# Patient Record
Sex: MALE | Race: WHITE | NOT HISPANIC OR LATINO | Employment: FULL TIME | URBAN - METROPOLITAN AREA
[De-identification: names, ages, dates, MRNs, and addresses within clinical notes are randomized per-mention and may not be internally consistent; named-entity substitution may affect disease eponyms.]

---

## 2023-07-20 ENCOUNTER — LAB (OUTPATIENT)
Dept: LAB | Facility: HOSPITAL | Age: 48
End: 2023-07-20
Payer: COMMERCIAL

## 2023-07-20 DIAGNOSIS — E89.0 POSTABLATIVE HYPOTHYROIDISM: ICD-10-CM

## 2023-07-20 PROBLEM — Z86.39 H/O GRAVES' DISEASE: Status: ACTIVE | Noted: 2023-07-20

## 2023-07-20 LAB
T-UPTAKE NFR SERPL: 0.96 TBI (ref 0.8–1.3)
T4 SERPL-MCNC: 8.36 MCG/DL (ref 4.5–11.7)
TSH SERPL DL<=0.05 MIU/L-ACNC: 2.67 UIU/ML (ref 0.27–4.2)

## 2023-07-20 PROCEDURE — 36415 COLL VENOUS BLD VENIPUNCTURE: CPT

## 2023-07-20 PROCEDURE — 84443 ASSAY THYROID STIM HORMONE: CPT

## 2023-07-20 PROCEDURE — 84436 ASSAY OF TOTAL THYROXINE: CPT

## 2023-07-20 PROCEDURE — 84479 ASSAY OF THYROID (T3 OR T4): CPT

## 2024-10-10 ENCOUNTER — OFFICE VISIT (OUTPATIENT)
Dept: FAMILY MEDICINE CLINIC | Age: 49
End: 2024-10-10
Payer: COMMERCIAL

## 2024-10-10 VITALS
OXYGEN SATURATION: 97 % | BODY MASS INDEX: 23.19 KG/M2 | WEIGHT: 153 LBS | HEART RATE: 68 BPM | DIASTOLIC BLOOD PRESSURE: 71 MMHG | TEMPERATURE: 98.3 F | HEIGHT: 68 IN | SYSTOLIC BLOOD PRESSURE: 119 MMHG

## 2024-10-10 DIAGNOSIS — Z11.59 SCREENING FOR VIRAL DISEASE: ICD-10-CM

## 2024-10-10 DIAGNOSIS — M79.604 PAIN IN BOTH LOWER EXTREMITIES: ICD-10-CM

## 2024-10-10 DIAGNOSIS — Z23 NEED FOR INFLUENZA VACCINATION: Primary | ICD-10-CM

## 2024-10-10 DIAGNOSIS — Z23 NEED FOR TDAP VACCINATION: ICD-10-CM

## 2024-10-10 DIAGNOSIS — R35.0 URINARY FREQUENCY: ICD-10-CM

## 2024-10-10 DIAGNOSIS — Z00.00 WELL ADULT EXAM: ICD-10-CM

## 2024-10-10 DIAGNOSIS — M79.605 PAIN IN BOTH LOWER EXTREMITIES: ICD-10-CM

## 2024-10-10 PROCEDURE — 90656 IIV3 VACC NO PRSV 0.5 ML IM: CPT | Performed by: NURSE PRACTITIONER

## 2024-10-10 PROCEDURE — 99396 PREV VISIT EST AGE 40-64: CPT | Performed by: NURSE PRACTITIONER

## 2024-10-10 PROCEDURE — 90471 IMMUNIZATION ADMIN: CPT | Performed by: NURSE PRACTITIONER

## 2024-10-10 PROCEDURE — 90715 TDAP VACCINE 7 YRS/> IM: CPT | Performed by: NURSE PRACTITIONER

## 2024-10-10 PROCEDURE — 90472 IMMUNIZATION ADMIN EACH ADD: CPT | Performed by: NURSE PRACTITIONER

## 2024-10-10 RX ORDER — MULTIVIT WITH MINERALS/LUTEIN
1000 TABLET ORAL DAILY
COMMUNITY

## 2024-10-10 RX ORDER — VIT C/B6/B5/MAGNESIUM/HERB 173 50-5-6-5MG
CAPSULE ORAL
COMMUNITY

## 2024-10-10 RX ORDER — CETIRIZINE HYDROCHLORIDE 10 MG/1
10 TABLET ORAL DAILY
COMMUNITY

## 2024-10-10 NOTE — PROGRESS NOTES
"Chief Complaint  Annual Exam (Physical )    Subjective          Jose Luis Dawson presents to [facility]     Patient is a 49-year-old male who is here today with his wife.  Here for annual physical.  Has been more than 10 years since his last tetanus shot.  Does not currently smoke cigarettes but vapes.  Previously had very vivid dreams on Chantix along with mood changes.  Sees endocrinology for history of Graves' disease with resulting hypothyroidism (dr nichole).  Is looking for a thyroid eye specialist to continue with vision monitoring.  He denies vision concerns but specific test are recommended due to exophthalmos.  He has had 2 or 3 colonoscopies last of which was 2 years ago.  There is family history of colon cancer but no family history of prostate cancer.  He does have some urinary frequency but denies difficulty starting or stopping his urinary stream or pain on urination.     Objective   Vital Signs:   Vitals:    10/10/24 1457   BP: 119/71   BP Location: Left arm   Patient Position: Sitting   Cuff Size: Adult   Pulse: 68   Temp: 98.3 °F (36.8 °C)   TempSrc: Oral   SpO2: 97%   Weight: 69.4 kg (153 lb)   Height: 173 cm (68.11\")       Wt Readings from Last 3 Encounters:   10/10/24 69.4 kg (153 lb)   07/20/23 68 kg (150 lb)      BP Readings from Last 3 Encounters:   10/10/24 119/71   07/20/23 109/73       Body mass index is 23.19 kg/m².    BMI is within normal parameters. No other follow-up for BMI required.       Physical Exam  Vitals reviewed.   Constitutional:       General: He is not in acute distress.     Appearance: Normal appearance. He is well-developed.   HENT:      Right Ear: Tympanic membrane normal.      Left Ear: Tympanic membrane normal.      Mouth/Throat:      Pharynx: No oropharyngeal exudate or posterior oropharyngeal erythema.   Eyes:      Comments: Mild exophthalmos   Neck:      Thyroid: No thyromegaly.   Cardiovascular:      Rate and Rhythm: Normal rate and " regular rhythm.      Heart sounds: Normal heart sounds.   Pulmonary:      Effort: Pulmonary effort is normal.      Breath sounds: Normal breath sounds.   Abdominal:      General: Abdomen is flat. Bowel sounds are normal. There is no distension.      Palpations: Abdomen is soft. There is no mass.      Tenderness: There is no abdominal tenderness. There is no guarding or rebound.   Musculoskeletal:      Right lower leg: No edema.      Left lower leg: No edema.   Skin:     General: Skin is warm and dry.   Neurological:      General: No focal deficit present.      Mental Status: He is alert.   Psychiatric:         Attention and Perception: Attention normal.         Mood and Affect: Mood and affect normal.         Behavior: Behavior normal.           Current Outpatient Medications:     ascorbic acid (VITAMIN C) 1000 MG tablet, Take 1 tablet by mouth Daily., Disp: , Rfl:     cetirizine (zyrTEC) 10 MG tablet, Take 1 tablet by mouth Daily., Disp: , Rfl:     Cholecalciferol (Vitamin D-3) 125 MCG (5000 UT) tablet, Take  by mouth., Disp: , Rfl:     Magnesium Glycinate 120 MG capsule, Take 2 capsules by mouth Daily., Disp: , Rfl:     Probiotic Product (PROBIOTIC PO), Take  by mouth., Disp: , Rfl:     Synthroid 150 MCG tablet, Take 1 tablet by mouth Daily. 1 tab by mouth every day, 0.5 tablet on Saturday and Sunday, Disp: , Rfl:     Turmeric 500 MG capsule, Take  by mouth., Disp: , Rfl:    Past Medical History:   Diagnosis Date    Hyperthyroidism 2014    Thyroid ablasion     No Known Allergies            Result Review :          No Images in the past 120 days found..           Social History     Tobacco Use   Smoking Status Every Day    Current packs/day: 0.50    Average packs/day: 0.5 packs/day for 27.8 years (13.9 ttl pk-yrs)    Types: Cigarettes    Start date: 1/1/1997   Smokeless Tobacco Never           Assessment and Plan    Diagnoses and all orders for this visit:    1. Need for influenza vaccination (Primary)  -      Fluzone >6mos ([id number])    2. Well adult exam  -     CBC w AUTO Differential; Future  -     Comprehensive metabolic panel; Future  -     Lipid panel; Future  -     Hemoglobin A1c; Future    3. Screening for viral disease  -     Hepatitis C antibody; Future    4. Pain in both lower extremities  -     Vitamin D 25 hydroxy; Future    5. Urinary frequency  -     Urinalysis With Culture If Indicated -; Future    6. Need for Tdap vaccination  -     Tdap Vaccine => 8yo IM (BOOSTRIX/ADACEL)        Follow Up    No follow-ups on file.  Patient was given instructions and counseling regarding his condition or for health maintenance advice. Please see specific information pulled into the AVS if appropriate.

## 2024-10-11 ENCOUNTER — LAB (OUTPATIENT)
Dept: LAB | Facility: HOSPITAL | Age: 49
End: 2024-10-11
Payer: COMMERCIAL

## 2024-10-11 DIAGNOSIS — R35.0 URINARY FREQUENCY: ICD-10-CM

## 2024-10-11 DIAGNOSIS — M79.604 PAIN IN BOTH LOWER EXTREMITIES: ICD-10-CM

## 2024-10-11 DIAGNOSIS — Z11.59 SCREENING FOR VIRAL DISEASE: ICD-10-CM

## 2024-10-11 DIAGNOSIS — M79.605 PAIN IN BOTH LOWER EXTREMITIES: ICD-10-CM

## 2024-10-11 DIAGNOSIS — Z00.00 WELL ADULT EXAM: ICD-10-CM

## 2024-10-11 LAB
25(OH)D3 SERPL-MCNC: 61.7 NG/ML (ref 30–100)
ALBUMIN SERPL-MCNC: 4.4 G/DL (ref 3.5–5.2)
ALBUMIN/GLOB SERPL: 2 G/DL
ALP SERPL-CCNC: 61 U/L (ref 39–117)
ALT SERPL W P-5'-P-CCNC: 22 U/L (ref 1–41)
ANION GAP SERPL CALCULATED.3IONS-SCNC: 7.3 MMOL/L (ref 5–15)
AST SERPL-CCNC: 21 U/L (ref 1–40)
BASOPHILS # BLD AUTO: 0.02 10*3/MM3 (ref 0–0.2)
BASOPHILS NFR BLD AUTO: 0.4 % (ref 0–1.5)
BILIRUB SERPL-MCNC: 0.9 MG/DL (ref 0–1.2)
BILIRUB UR QL STRIP: NEGATIVE
BUN SERPL-MCNC: 12 MG/DL (ref 6–20)
BUN/CREAT SERPL: 14 (ref 7–25)
CALCIUM SPEC-SCNC: 9 MG/DL (ref 8.6–10.5)
CHLORIDE SERPL-SCNC: 106 MMOL/L (ref 98–107)
CHOLEST SERPL-MCNC: 192 MG/DL (ref 0–200)
CLARITY UR: CLEAR
CO2 SERPL-SCNC: 26.7 MMOL/L (ref 22–29)
COLOR UR: YELLOW
CREAT SERPL-MCNC: 0.86 MG/DL (ref 0.76–1.27)
DEPRECATED RDW RBC AUTO: 50.3 FL (ref 37–54)
EGFRCR SERPLBLD CKD-EPI 2021: 107.5 ML/MIN/1.73
EOSINOPHIL # BLD AUTO: 0.2 10*3/MM3 (ref 0–0.4)
EOSINOPHIL NFR BLD AUTO: 4.3 % (ref 0.3–6.2)
ERYTHROCYTE [DISTWIDTH] IN BLOOD BY AUTOMATED COUNT: 13.7 % (ref 12.3–15.4)
GLOBULIN UR ELPH-MCNC: 2.2 GM/DL
GLUCOSE SERPL-MCNC: 88 MG/DL (ref 65–99)
GLUCOSE UR STRIP-MCNC: NEGATIVE MG/DL
HBA1C MFR BLD: 5.1 % (ref 4.8–5.6)
HCT VFR BLD AUTO: 42.2 % (ref 37.5–51)
HCV AB SER QL: NORMAL
HDLC SERPL-MCNC: 87 MG/DL (ref 40–60)
HGB BLD-MCNC: 13.8 G/DL (ref 13–17.7)
HGB UR QL STRIP.AUTO: NEGATIVE
IMM GRANULOCYTES # BLD AUTO: 0.01 10*3/MM3 (ref 0–0.05)
IMM GRANULOCYTES NFR BLD AUTO: 0.2 % (ref 0–0.5)
KETONES UR QL STRIP: NEGATIVE
LDLC SERPL CALC-MCNC: 92 MG/DL (ref 0–100)
LDLC/HDLC SERPL: 1.04 {RATIO}
LEUKOCYTE ESTERASE UR QL STRIP.AUTO: NEGATIVE
LYMPHOCYTES # BLD AUTO: 1.19 10*3/MM3 (ref 0.7–3.1)
LYMPHOCYTES NFR BLD AUTO: 25.4 % (ref 19.6–45.3)
MCH RBC QN AUTO: 31.9 PG (ref 26.6–33)
MCHC RBC AUTO-ENTMCNC: 32.7 G/DL (ref 31.5–35.7)
MCV RBC AUTO: 97.7 FL (ref 79–97)
MONOCYTES # BLD AUTO: 0.56 10*3/MM3 (ref 0.1–0.9)
MONOCYTES NFR BLD AUTO: 12 % (ref 5–12)
NEUTROPHILS NFR BLD AUTO: 2.7 10*3/MM3 (ref 1.7–7)
NEUTROPHILS NFR BLD AUTO: 57.7 % (ref 42.7–76)
NITRITE UR QL STRIP: NEGATIVE
PH UR STRIP.AUTO: 5.5 [PH] (ref 5–8)
PLATELET # BLD AUTO: 234 10*3/MM3 (ref 140–450)
PMV BLD AUTO: 8.3 FL (ref 6–12)
POTASSIUM SERPL-SCNC: 4.3 MMOL/L (ref 3.5–5.2)
PROT SERPL-MCNC: 6.6 G/DL (ref 6–8.5)
PROT UR QL STRIP: NEGATIVE
RBC # BLD AUTO: 4.32 10*6/MM3 (ref 4.14–5.8)
SODIUM SERPL-SCNC: 140 MMOL/L (ref 136–145)
SP GR UR STRIP: 1.02 (ref 1–1.03)
TRIGL SERPL-MCNC: 72 MG/DL (ref 0–150)
UROBILINOGEN UR QL STRIP: NORMAL
VLDLC SERPL-MCNC: 13 MG/DL (ref 5–40)
WBC NRBC COR # BLD AUTO: 4.68 10*3/MM3 (ref 3.4–10.8)

## 2024-10-11 PROCEDURE — 81003 URINALYSIS AUTO W/O SCOPE: CPT

## 2024-10-11 PROCEDURE — 83036 HEMOGLOBIN GLYCOSYLATED A1C: CPT

## 2024-10-11 PROCEDURE — 85025 COMPLETE CBC W/AUTO DIFF WBC: CPT

## 2024-10-11 PROCEDURE — 82306 VITAMIN D 25 HYDROXY: CPT

## 2024-10-11 PROCEDURE — 36415 COLL VENOUS BLD VENIPUNCTURE: CPT

## 2024-10-11 PROCEDURE — 80053 COMPREHEN METABOLIC PANEL: CPT

## 2024-10-11 PROCEDURE — 86803 HEPATITIS C AB TEST: CPT

## 2024-10-11 PROCEDURE — 80061 LIPID PANEL: CPT

## 2024-10-14 NOTE — PROGRESS NOTES
Cholesterol is normal.  HDL is good cholesterol.  You are not anemic and have never had hepatitis C.  Urinalysis is normal.  Blood sugar, kidney, and liver function are normal.  You are not diabetic or prediabetic.  Labs look very good.  Consider referral to urologist for evaluation of frequent urination.

## 2024-10-14 NOTE — ASSESSMENT & PLAN NOTE
Preventive care measures are discussed.  Consider prostate exam, PSA testing, referral to urology if frequent urination persist.  Further treatment recommendations pending lab results.

## 2025-06-09 ENCOUNTER — APPOINTMENT (OUTPATIENT)
Dept: ULTRASOUND IMAGING | Facility: HOSPITAL | Age: 50
End: 2025-06-09
Payer: COMMERCIAL

## 2025-06-09 ENCOUNTER — HOSPITAL ENCOUNTER (EMERGENCY)
Facility: HOSPITAL | Age: 50
Discharge: HOME | End: 2025-06-09
Attending: EMERGENCY MEDICINE | Admitting: EMERGENCY MEDICINE
Payer: COMMERCIAL

## 2025-06-09 ENCOUNTER — APPOINTMENT (OUTPATIENT)
Dept: CT IMAGING | Facility: HOSPITAL | Age: 50
End: 2025-06-09
Payer: COMMERCIAL

## 2025-06-09 VITALS
DIASTOLIC BLOOD PRESSURE: 69 MMHG | HEIGHT: 68 IN | OXYGEN SATURATION: 96 % | RESPIRATION RATE: 18 BRPM | WEIGHT: 152.56 LBS | TEMPERATURE: 99.3 F | HEART RATE: 91 BPM | BODY MASS INDEX: 23.12 KG/M2 | SYSTOLIC BLOOD PRESSURE: 119 MMHG

## 2025-06-09 DIAGNOSIS — N45.3 EPIDIDYMOORCHITIS: Primary | ICD-10-CM

## 2025-06-09 LAB
ANION GAP SERPL CALCULATED.3IONS-SCNC: 12.3 MMOL/L (ref 5–15)
BASOPHILS # BLD AUTO: 0.03 10*3/MM3 (ref 0–0.2)
BASOPHILS NFR BLD AUTO: 0.3 % (ref 0–1.5)
BILIRUB UR QL STRIP: NEGATIVE
BUN SERPL-MCNC: 10.9 MG/DL (ref 6–20)
BUN/CREAT SERPL: 14.5 (ref 7–25)
CALCIUM SPEC-SCNC: 9 MG/DL (ref 8.6–10.5)
CHLORIDE SERPL-SCNC: 98 MMOL/L (ref 98–107)
CLARITY UR: CLEAR
CO2 SERPL-SCNC: 23.7 MMOL/L (ref 22–29)
COLOR UR: YELLOW
CREAT SERPL-MCNC: 0.75 MG/DL (ref 0.76–1.27)
D-LACTATE SERPL-SCNC: 1.1 MMOL/L (ref 0.5–2)
DEPRECATED RDW RBC AUTO: 50.2 FL (ref 37–54)
EGFRCR SERPLBLD CKD-EPI 2021: 111.3 ML/MIN/1.73
EOSINOPHIL # BLD AUTO: 0.07 10*3/MM3 (ref 0–0.4)
EOSINOPHIL NFR BLD AUTO: 0.7 % (ref 0.3–6.2)
ERYTHROCYTE [DISTWIDTH] IN BLOOD BY AUTOMATED COUNT: 14.3 % (ref 12.3–15.4)
FLUAV RNA RESP QL NAA+PROBE: NOT DETECTED
FLUBV RNA RESP QL NAA+PROBE: NOT DETECTED
GLUCOSE SERPL-MCNC: 99 MG/DL (ref 65–99)
GLUCOSE UR STRIP-MCNC: NEGATIVE MG/DL
HCT VFR BLD AUTO: 43.4 % (ref 37.5–51)
HGB BLD-MCNC: 14.6 G/DL (ref 13–17.7)
HGB UR QL STRIP.AUTO: NEGATIVE
IMM GRANULOCYTES # BLD AUTO: 0.03 10*3/MM3 (ref 0–0.05)
IMM GRANULOCYTES NFR BLD AUTO: 0.3 % (ref 0–0.5)
KETONES UR QL STRIP: ABNORMAL
LEUKOCYTE ESTERASE UR QL STRIP.AUTO: NEGATIVE
LYMPHOCYTES # BLD AUTO: 0.6 10*3/MM3 (ref 0.7–3.1)
LYMPHOCYTES NFR BLD AUTO: 6.1 % (ref 19.6–45.3)
MCH RBC QN AUTO: 32.2 PG (ref 26.6–33)
MCHC RBC AUTO-ENTMCNC: 33.6 G/DL (ref 31.5–35.7)
MCV RBC AUTO: 95.8 FL (ref 79–97)
MONOCYTES # BLD AUTO: 0.76 10*3/MM3 (ref 0.1–0.9)
MONOCYTES NFR BLD AUTO: 7.7 % (ref 5–12)
NEUTROPHILS NFR BLD AUTO: 8.41 10*3/MM3 (ref 1.7–7)
NEUTROPHILS NFR BLD AUTO: 84.9 % (ref 42.7–76)
NITRITE UR QL STRIP: NEGATIVE
NRBC BLD AUTO-RTO: 0 /100 WBC (ref 0–0.2)
PH UR STRIP.AUTO: 8 [PH] (ref 5–8)
PLATELET # BLD AUTO: 229 10*3/MM3 (ref 140–450)
PMV BLD AUTO: 8.7 FL (ref 6–12)
POTASSIUM SERPL-SCNC: 4 MMOL/L (ref 3.5–5.2)
PROT UR QL STRIP: NEGATIVE
RBC # BLD AUTO: 4.53 10*6/MM3 (ref 4.14–5.8)
RSV RNA RESP QL NAA+PROBE: NOT DETECTED
SARS-COV-2 RNA RESP QL NAA+PROBE: NOT DETECTED
SODIUM SERPL-SCNC: 134 MMOL/L (ref 136–145)
SP GR UR STRIP: 1.02 (ref 1–1.03)
UROBILINOGEN UR QL STRIP: ABNORMAL
WBC NRBC COR # BLD AUTO: 9.9 10*3/MM3 (ref 3.4–10.8)

## 2025-06-09 PROCEDURE — 99284 EMERGENCY DEPT VISIT MOD MDM: CPT

## 2025-06-09 PROCEDURE — 87468 ANAPLSMA PHGCYTOPHLM AMP PRB: CPT | Performed by: EMERGENCY MEDICINE

## 2025-06-09 PROCEDURE — 25010000002 HYDROMORPHONE 1 MG/ML SOLUTION: Performed by: EMERGENCY MEDICINE

## 2025-06-09 PROCEDURE — 36415 COLL VENOUS BLD VENIPUNCTURE: CPT

## 2025-06-09 PROCEDURE — 81003 URINALYSIS AUTO W/O SCOPE: CPT | Performed by: EMERGENCY MEDICINE

## 2025-06-09 PROCEDURE — 87484 EHRLICHA CHAFFEENSIS AMP PRB: CPT | Performed by: EMERGENCY MEDICINE

## 2025-06-09 PROCEDURE — 25810000003 SODIUM CHLORIDE 0.9 % SOLUTION: Performed by: EMERGENCY MEDICINE

## 2025-06-09 PROCEDURE — 85025 COMPLETE CBC W/AUTO DIFF WBC: CPT | Performed by: EMERGENCY MEDICINE

## 2025-06-09 PROCEDURE — 80048 BASIC METABOLIC PNL TOTAL CA: CPT | Performed by: EMERGENCY MEDICINE

## 2025-06-09 PROCEDURE — 76870 US EXAM SCROTUM: CPT

## 2025-06-09 PROCEDURE — 25010000002 ONDANSETRON PER 1 MG: Performed by: EMERGENCY MEDICINE

## 2025-06-09 PROCEDURE — 87637 SARSCOV2&INF A&B&RSV AMP PRB: CPT | Performed by: EMERGENCY MEDICINE

## 2025-06-09 PROCEDURE — 87040 BLOOD CULTURE FOR BACTERIA: CPT | Performed by: EMERGENCY MEDICINE

## 2025-06-09 PROCEDURE — 96374 THER/PROPH/DIAG INJ IV PUSH: CPT

## 2025-06-09 PROCEDURE — 83605 ASSAY OF LACTIC ACID: CPT | Performed by: EMERGENCY MEDICINE

## 2025-06-09 PROCEDURE — 74176 CT ABD & PELVIS W/O CONTRAST: CPT

## 2025-06-09 PROCEDURE — 96375 TX/PRO/DX INJ NEW DRUG ADDON: CPT

## 2025-06-09 PROCEDURE — 86618 LYME DISEASE ANTIBODY: CPT | Performed by: EMERGENCY MEDICINE

## 2025-06-09 PROCEDURE — 25010000002 CEFTRIAXONE PER 250 MG: Performed by: EMERGENCY MEDICINE

## 2025-06-09 PROCEDURE — 87798 DETECT AGENT NOS DNA AMP: CPT | Performed by: EMERGENCY MEDICINE

## 2025-06-09 RX ORDER — ONDANSETRON 2 MG/ML
4 INJECTION INTRAMUSCULAR; INTRAVENOUS ONCE
Status: COMPLETED | OUTPATIENT
Start: 2025-06-09 | End: 2025-06-09

## 2025-06-09 RX ORDER — HYDROCODONE BITARTRATE AND ACETAMINOPHEN 5; 325 MG/1; MG/1
1 TABLET ORAL EVERY 8 HOURS PRN
Qty: 12 TABLET | Refills: 0 | Status: SHIPPED | OUTPATIENT
Start: 2025-06-09 | End: 2025-06-16

## 2025-06-09 RX ORDER — ACETAMINOPHEN 500 MG
1000 TABLET ORAL ONCE
Status: COMPLETED | OUTPATIENT
Start: 2025-06-09 | End: 2025-06-09

## 2025-06-09 RX ORDER — DOXYCYCLINE 100 MG/1
100 CAPSULE ORAL 2 TIMES DAILY
Qty: 14 CAPSULE | Refills: 0 | Status: SHIPPED | OUTPATIENT
Start: 2025-06-09 | End: 2025-06-16

## 2025-06-09 RX ORDER — SODIUM CHLORIDE 0.9 % (FLUSH) 0.9 %
10 SYRINGE (ML) INJECTION AS NEEDED
Status: DISCONTINUED | OUTPATIENT
Start: 2025-06-09 | End: 2025-06-09 | Stop reason: HOSPADM

## 2025-06-09 RX ADMIN — ONDANSETRON 4 MG: 2 INJECTION INTRAMUSCULAR; INTRAVENOUS at 18:01

## 2025-06-09 RX ADMIN — SODIUM CHLORIDE 1000 ML: 9 INJECTION, SOLUTION INTRAVENOUS at 19:17

## 2025-06-09 RX ADMIN — ACETAMINOPHEN 1000 MG: 500 TABLET ORAL at 18:02

## 2025-06-09 RX ADMIN — CEFTRIAXONE SODIUM 1000 MG: 1 INJECTION, POWDER, FOR SOLUTION INTRAMUSCULAR; INTRAVENOUS at 20:31

## 2025-06-09 RX ADMIN — HYDROMORPHONE HYDROCHLORIDE 0.5 MG: 1 INJECTION, SOLUTION INTRAMUSCULAR; INTRAVENOUS; SUBCUTANEOUS at 18:02

## 2025-06-09 NOTE — Clinical Note
[facility] EMERGENCY ROOM  [address] [place] [address]  Phone: [phone]  Fax: [phone]    Jose Luis Dawson was seen and treated in our emergency department on 6/9/2025.  He may return to work on 06/12/2025.         Thank you for choosing [facility].    Mick Mckeon MD

## 2025-06-09 NOTE — Clinical Note
[facility] EMERGENCY ROOM  [address] [place] [address]  Phone: [phone]  Fax: [phone]    Jose Luis Dawson was seen and treated in our emergency department on 6/9/2025.  He may return to work on 06/16/2025.         Thank you for choosing [facility].    Mick Mckeon MD

## 2025-06-09 NOTE — ED PROVIDER NOTES
Time: 5:32 PM EDT  Date of encounter:  6/9/2025  Independent Historian/Clinical History and Information was obtained by:   Patient    History is limited by: N/A    Chief Complaint: Left testicular pain      History of Present Illness:  Patient is a 50 y.o. year old male who presents to the emergency department for evaluation of left testicular pain since waking up today.  Patient does note that he pulled a tick off of his scrotum approximately 3 weeks ago but thinks it may have been the right side and feels that there was no head left.  Had no complaints until this morning when he developed left testicular discomfort.  He does note that he has had chills and the pain does seem to radiate to his left groin/pelvic region and into his left back as well.          Patient Care Team  Primary Care Provider: Perla Rodriguez APRN    Past Medical History:     No Known Allergies  Past Medical History:   Diagnosis Date    Hyperthyroidism 2014    Thyroid ablasion     Past Surgical History:   Procedure Laterality Date    COLONOSCOPY  2023    HAND SURGERY Right     TEAR DUCT SURGERY Bilateral     VASECTOMY  2007     Family History   Problem Relation Age of Onset    Hypothyroidism Mother     Thyroid disease Mother     Hypertension Father     Hypothyroidism Sister     Thyroid disease Sister     Colon cancer Paternal Grandfather     COPD Maternal Grandfather     Arthritis Maternal Grandmother     Thyroid disease Maternal Grandmother     Arthritis Paternal Grandmother        Home Medications:  Prior to Admission medications    Medication Sig Start Date End Date Taking? Authorizing Provider   ascorbic acid (VITAMIN C) 1000 MG tablet Take 1 tablet by mouth Daily.    Johana Gordon MD   cetirizine (zyrTEC) 10 MG tablet Take 1 tablet by mouth Daily.    Johana Gordon MD   Cholecalciferol (Vitamin D-3) 125 MCG (5000 UT) tablet Take  by mouth.    Johana Gordon MD   Magnesium Glycinate 120 MG capsule Take 2 capsules  "by mouth Daily.    Johana Gordon MD   Probiotic Product (PROBIOTIC PO) Take  by mouth.    Johana Gordon MD   Synthroid 150 MCG tablet Take 1 tablet by mouth Daily. 1 tab by mouth every day, 0.5 tablet on Saturday and Vince 7/10/23   Johana Gordon MD   Turmeric 500 MG capsule Take  by mouth.    Johana Gordon MD        Social History:   Social History     Tobacco Use    Smoking status: Every Day     Current packs/day: 0.50     Average packs/day: 0.5 packs/day for 28.4 years (14.2 ttl pk-yrs)     Types: Cigarettes     Start date: 1/1/1997    Smokeless tobacco: Never   Vaping Use    Vaping status: Never Used   Substance Use Topics    Alcohol use: Yes     Alcohol/week: 10.0 standard drinks of alcohol     Types: 10 Cans of beer per week     Comment: weekends    Drug use: Never         Review of Systems:  Review of Systems   Constitutional:  Negative for chills and fever.   HENT:  Negative for congestion, rhinorrhea and sore throat.    Eyes:  Negative for photophobia.   Respiratory:  Negative for apnea, cough, chest tightness and shortness of breath.    Cardiovascular:  Negative for chest pain and palpitations.   Gastrointestinal:  Negative for abdominal pain, diarrhea, nausea and vomiting.   Endocrine: Negative.    Genitourinary:  Positive for flank pain, scrotal swelling and testicular pain. Negative for difficulty urinating, dysuria, genital sores, hematuria, penile discharge and penile pain.   Musculoskeletal:  Positive for back pain. Negative for joint swelling and myalgias.   Skin:  Negative for color change and wound.   Allergic/Immunologic: Negative.    Neurological:  Negative for seizures and headaches.   Psychiatric/Behavioral: Negative.     All other systems reviewed and are negative.       Physical Exam:  /69   Pulse 91   Temp 99.3 °F (37.4 °C) (Oral)   Resp 18   Ht 172.7 cm (68\")   Wt 69.2 kg (152 lb 8.9 oz)   SpO2 96%   BMI 23.20 kg/m²     Physical Exam  Vitals " and nursing note reviewed.   Constitutional:       General: He is awake.      Appearance: Normal appearance.   HENT:      Head: Normocephalic and atraumatic.      Nose: Nose normal.      Mouth/Throat:      Mouth: Mucous membranes are moist.   Eyes:      Extraocular Movements: Extraocular movements intact.      Pupils: Pupils are equal, round, and reactive to light.   Cardiovascular:      Rate and Rhythm: Normal rate and regular rhythm.      Heart sounds: Normal heart sounds.   Pulmonary:      Effort: Pulmonary effort is normal. No respiratory distress.      Breath sounds: Normal breath sounds. No wheezing, rhonchi or rales.   Abdominal:      General: Bowel sounds are normal.      Palpations: Abdomen is soft.      Tenderness: There is no abdominal tenderness. There is no guarding or rebound.      Comments: No rigidity   Musculoskeletal:         General: No tenderness. Normal range of motion.      Cervical back: Normal range of motion and neck supple.   Skin:     General: Skin is warm and dry.      Coloration: Skin is not jaundiced.   Neurological:      General: No focal deficit present.      Mental Status: Mental status is at baseline.   Psychiatric:         Mood and Affect: Mood normal.                    Medical Decision Making:      Comorbidities that affect care:    Hyperthyroidism    External Notes reviewed:    Previous Clinic Note: Family medicine office visit 10/10/2024.  Description: Need for influenza vaccination      The following orders were placed and all results were independently analyzed by me:  Orders Placed This Encounter   Procedures    Blood Culture - Blood,    Blood Culture - Blood,    COVID-19, FLU A/B, RSV PCR 1 HR TAT - Swab, Nasopharynx    CT Abdomen Pelvis Without Contrast    US Scrotum & Testicles    Basic Metabolic Panel    Urinalysis With Culture If Indicated - Urine, Clean Catch    CBC Auto Differential    Ehrlichia Profile DNA PCR    Rickettsia Species DNA, Real-Time PCR    Lactic Acid,  Plasma    Lyme Disease Total Antibody With Reflex to Immunoassay    CBC & Differential       Medications Given in the Emergency Department:  Medications   ondansetron (ZOFRAN) injection 4 mg (4 mg Intravenous Given 6/9/25 1801)   HYDROmorphone (DILAUDID) injection 0.5 mg (0.5 mg Intravenous Given 6/9/25 1802)   acetaminophen (TYLENOL) tablet 1,000 mg (1,000 mg Oral Given 6/9/25 1802)   sodium chloride 0.9 % bolus 1,000 mL (0 mL Intravenous Stopped 6/9/25 2023)   cefTRIAXone (ROCEPHIN) in NS 1 gram/10ml IV PUSH syringe (1,000 mg Intravenous Given 6/9/25 2031)        ED Course:         Labs:    Lab Results (last 24 hours)       Procedure Component Value Units Date/Time    CBC & Differential [[id number]]  (Abnormal) Collected: 06/09/25 1736    Specimen: Blood Updated: 06/09/25 1751    Narrative:      The following orders were created for panel order CBC & Differential.  Procedure                               Abnormality         Status                     ---------                               -----------         ------                     CBC Auto Differential[[id number]]        Abnormal            Final result                 Please view results for these tests on the individual orders.    Basic Metabolic Panel [[id number]]  (Abnormal) Collected: 06/09/25 1736    Specimen: Blood Updated: 06/09/25 1829     Glucose 99 mg/dL      BUN 10.9 mg/dL      Creatinine 0.75 mg/dL      Sodium 134 mmol/L      Potassium 4.0 mmol/L      Chloride 98 mmol/L      CO2 23.7 mmol/L      Calcium 9.0 mg/dL      BUN/Creatinine Ratio 14.5     Anion Gap 12.3 mmol/L      eGFR 111.3 mL/min/1.73     Narrative:      GFR Categories in Chronic Kidney Disease (CKD)              GFR Category          GFR (mL/min/1.73)    Interpretation  G1                    90 or greater        Normal or high (1)  G2                    60-89                Mild decrease (1)  G3a                   45-59                Mild to moderate decrease  G3b                    30-44                Moderate to severe decrease  G4                    15-29                Severe decrease  G5                    14 or less           Kidney failure    (1)In the absence of evidence of kidney disease, neither GFR category G1 or G2 fulfill the criteria for CKD.    eGFR calculation 2021 CKD-EPI creatinine equation, which does not include race as a factor    CBC Auto Differential [[id number]]  (Abnormal) Collected: 06/09/25 1736    Specimen: Blood Updated: 06/09/25 1751     WBC 9.90 10*3/mm3      RBC 4.53 10*6/mm3      Hemoglobin 14.6 g/dL      Hematocrit 43.4 %      MCV 95.8 fL      MCH 32.2 pg      MCHC 33.6 g/dL      RDW 14.3 %      RDW-SD 50.2 fl      MPV 8.7 fL      Platelets 229 10*3/mm3      Neutrophil % 84.9 %      Lymphocyte % 6.1 %      Monocyte % 7.7 %      Eosinophil % 0.7 %      Basophil % 0.3 %      Immature Grans % 0.3 %      Neutrophils, Absolute 8.41 10*3/mm3      Lymphocytes, Absolute 0.60 10*3/mm3      Monocytes, Absolute 0.76 10*3/mm3      Eosinophils, Absolute 0.07 10*3/mm3      Basophils, Absolute 0.03 10*3/mm3      Immature Grans, Absolute 0.03 10*3/mm3      nRBC 0.0 /100 WBC     Ehrlichia Profile DNA PCR [[id number]] Collected: 06/09/25 1736    Specimen: Blood Updated: 06/09/25 1748    Rickettsia Species DNA, Real-Time PCR [[id number]] Collected: 06/09/25 1736    Specimen: Blood Updated: 06/09/25 1748    Lactic Acid, Plasma [[id number]]  (Normal) Collected: 06/09/25 1744    Specimen: Blood Updated: 06/09/25 1826     Lactate 1.1 mmol/L     Urinalysis With Culture If Indicated - Urine, Clean Catch [[id number]]  (Abnormal) Collected: 06/09/25 1802    Specimen: Urine, Clean Catch Updated: 06/09/25 1817     Color, UA Yellow     Appearance, UA Clear     pH, UA 8.0     Specific Gravity, UA 1.019     Glucose, UA Negative     Ketones, UA 40 mg/dL (2+)     Bilirubin, UA Negative     Blood, UA Negative     Protein, UA Negative     Leuk Esterase, UA Negative     Nitrite, UA Negative      Urobilinogen, UA 1.0 E.U./dL    Narrative:      In absence of clinical symptoms, the presence of pyuria, bacteria, and/or nitrites on the urinalysis result does not correlate with infection.  Urine microscopic not indicated.    Blood Culture - Blood, Arm, Left [[id number]] Collected: 06/09/25 1824    Specimen: Blood from Arm, Left Updated: 06/09/25 1832    Blood Culture - Blood, Arm, Right [[id number]] Collected: 06/09/25 1824    Specimen: Blood from Arm, Right Updated: 06/09/25 1832    COVID-19, FLU A/B, RSV PCR 1 HR TAT - Swab, Nasopharynx [[id number]]  (Normal) Collected: 06/09/25 1826    Specimen: Swab from Nasopharynx Updated: 06/09/25 1932     COVID19 Not Detected     Influenza A PCR Not Detected     Influenza B PCR Not Detected     RSV, PCR Not Detected    Narrative:      Fact sheet for providers: https://www.fda.gov/media/563497/download    Fact sheet for patients: https://www.fda.gov/media/842212/download    Test performed by PCR.    Lyme Disease Total Antibody With Reflex to Immunoassay [[id number]] Collected: 06/09/25 1919    Specimen: Blood Updated: 06/09/25 1928             Imaging:    US Scrotum & Testicles  Result Date: 6/9/2025  US SCROTUM AND TESTICLES Date of Exam: 6/9/2025 6:28 PM EDT Indication: Left testicular/scrotal swelling, fever. Comparison: CT abdomen pelvis without contrast dated 6/9/2025 Technique: Multiple sonographic images of the scrotum were obtained in transverse and longitudinal planes. Grayscale and color Doppler duplex techniques were utilized.  Doppler spectral analysis was performed. Findings: Testicles are normal in position and morphology without intratesticular mass or microlithiasis. The right testicle is 4.4 x 2.3 x 2.6 cm (14 mL). The left testicle is mildly enlarged measuring 4.3 x 2.9 x 3.3 cm (21 mL). The left testicle and left epididymis are markedly hypervascular relative to the left testicle and epididymis. No epididymal masses. The epididymis are symmetric in size the  epididymal head measures 1 cm in maximum dimension each. There is a small to moderate left hydrocele with mobile debris. No right-sided hydroceles. No varicoceles or peristalsing bowel. No scrotal wall edema.     Impression: Diffusely enlarged hypervascular left testicle and hypervascular epididymis with a small to moderate complex left hydrocele. Findings are compatible with left epididymoorchitis. Electronically Signed: Ottomelva Shanks DO  6/9/2025 7:14 PM EDT  Workstation ID: [id number]    CT Abdomen Pelvis Without Contrast  Result Date: 6/9/2025  CT ABDOMEN PELVIS WO CONTRAST Date of Exam: 6/9/2025 5:32 PM EDT Indication: Flank pain, kidney stone suspected, Rule out left ureteral stone, flank pain. Comparison: None available Technique: Axial CT images were obtained of the abdomen and pelvis without the administration of contrast. Reconstructed coronal and sagittal images were also obtained. Automated exposure control and iterative construction methods were used. Findings: The heart size is normal. There is no pericardial effusion. The lung bases are clear. The liver is normal in size and contour. There is no focal liver lesion by noncontrast technique. The gallbladder is present without wall thickening. There is no intrahepatic or extrahepatic biliary ductal dilatation. The spleen is normal in size. The adrenal glands and pancreas appear within normal limits. There is no pancreatic ductal dilatation. The kidneys are symmetric in size. There is no hydronephrosis or hydroureter. There is no urolithiasis. The urinary bladder is fluid-filled without wall thickening. There is edema and fluid within the scrotum. Question asymmetric enlargement of the left testicle. The stomach and duodenum are normal in caliber and configuration. There are no abnormally dilated loops of small bowel to suggest small bowel obstruction or small bowel inflammation. The appendix is visualized and normal within the right lower quadrant. There  is no acute colitis or diverticulitis. There is no free fluid or free air. The aorta is normal in caliber without aneurysm formation. There is no lymphadenopathy. There are no acute osseous findings.     Impression: 1.No evidence of urolithiasis or hydronephrosis. 2.Edema and fluid within the scrotum. Question asymmetric enlargement of the left testicle. Recommend correlation with scrotal ultrasound. Electronically Signed: Esa Rai MD  6/9/2025 5:54 PM EDT  Workstation ID: [id number]        Differential Diagnosis and Discussion:    Testicular Pain: Differential diagnosis includes but is not limited to epididymitis, orchitis, testicular torsion, testicular tumor, testicular trauma, hydrocele, varicocele, spermatocele, prostatitis, scrotal cellulitis, and urolithiasis.    PROCEDURES:    Labs were collected in the emergency department and all labs were reviewed and interpreted by me.  CT scan was performed in the emergency department and the CT scan radiology impression was interpreted by me.  Ultrasound was performed in the emergency department and the ultrasound impression was interpreted by me.     No orders to display       Procedures    MDM                     Patient Care Considerations:          Consultants/Shared Management Plan:    Consultant: I have discussed the case with Dr. Mcmahon, urology on-call who states she is comfortable with our plan for outpatient treatment if the patient is agreeable and reliable.  She does strongly recommend aggressive rehydration and strong return warnings.    Social Determinants of Health:    Patient is independent, reliable, and has access to care.       Disposition and Care Coordination:    Discharged: I considered escalation of care by admitting this patient to the hospital, however the patient has a normal white blood cell count, lactic acid.  He is not hypotensive or tachycardic at the time of discharge.  He is very well-appearing and agreeable to stated strong  return warnings.    I have explained the patient´s condition, diagnoses and treatment plan based on the information available to me at this time. I have answered questions and addressed any concerns. The patient has a good  understanding of the patient´s diagnosis, condition, and treatment plan as can be expected at this point. The vital signs have been stable. The patient´s condition is stable and appropriate for discharge from the emergency department.      The patient will pursue further outpatient evaluation with the primary care physician or other designated or consulting physician as outlined in the discharge instructions. They are agreeable to this plan of care and follow-up instructions have been explained in detail. The patient has received these instructions in written format and has expressed an understanding of the discharge instructions. The patient is aware that any significant change in condition or worsening of symptoms should prompt an immediate return to this or the closest emergency department or call to 911.    Final diagnoses:   Epididymoorchitis        ED Disposition       ED Disposition   Discharge    Condition   Stable    Comment   --               This medical record created using voice recognition software.             Mick Mckeon MD  06/10/25 0259

## 2025-06-09 NOTE — Clinical Note
[facility] EMERGENCY ROOM  [address] [place] [address]  Phone: [phone]  Fax: [phone]    Jose Luis Dawson was seen and treated in our emergency department on 6/9/2025.  He may return to work on 06/12/2025.         Thank you for choosing [facility].    Mick Mckeon MD

## 2025-06-09 NOTE — Clinical Note
[facility] EMERGENCY ROOM  [address] [place] [address]  Phone: [phone]  Fax: [phone]    Jose Luis Dawson was seen and treated in our emergency department on 6/9/2025.  He may return to work on 06/11/2025.         Thank you for choosing [facility].    Mick Mckeon MD

## 2025-06-10 NOTE — DISCHARGE INSTRUCTIONS
Take the antibiotics as prescribed.  The urologist has recommended being very vigilant about staying well-hydrated as this can improve epididymoorchitis.

## 2025-06-10 NOTE — ED NOTES
Patient a&ox4 and agreeable to discharge. Patient education provided and follow up encouraged. Two rx faxed to pharmacy. Skin pwd, rr even and unlabored, no s/sx of distress prior to discharge

## 2025-06-11 LAB — B BURGDOR IGG+IGM SER QL IA: NEGATIVE

## 2025-06-12 LAB
A PHAGOCYTOPH DNA BLD QL NAA+PROBE: NEGATIVE
EHRLICHIA DNA SPEC QL NAA+PROBE: NEGATIVE

## 2025-06-13 LAB — RICKETTSIA RICKETTSII DNA, RT: NOT DETECTED

## 2025-06-14 LAB
BACTERIA SPEC AEROBE CULT: NORMAL
BACTERIA SPEC AEROBE CULT: NORMAL

## 2025-06-16 ENCOUNTER — OFFICE VISIT (OUTPATIENT)
Dept: UROLOGY | Facility: CLINIC | Age: 50
End: 2025-06-16
Payer: COMMERCIAL

## 2025-06-16 VITALS — HEIGHT: 68 IN | WEIGHT: 152 LBS | BODY MASS INDEX: 23.04 KG/M2

## 2025-06-16 DIAGNOSIS — N45.3 EPIDIDYMOORCHITIS: Primary | ICD-10-CM

## 2025-06-16 PROBLEM — Z00.00 WELL ADULT EXAM: Status: RESOLVED | Noted: 2024-10-10 | Resolved: 2025-06-16

## 2025-06-16 PROBLEM — Z23 NEED FOR INFLUENZA VACCINATION: Status: RESOLVED | Noted: 2024-10-10 | Resolved: 2025-06-16

## 2025-06-16 PROBLEM — Z23 NEED FOR TDAP VACCINATION: Status: RESOLVED | Noted: 2024-10-10 | Resolved: 2025-06-16

## 2025-06-16 PROBLEM — Z11.59 SCREENING FOR VIRAL DISEASE: Status: RESOLVED | Noted: 2024-10-10 | Resolved: 2025-06-16

## 2025-06-16 PROCEDURE — 99214 OFFICE O/P EST MOD 30 MIN: CPT | Performed by: NURSE PRACTITIONER

## 2025-06-16 RX ORDER — NAPROXEN SODIUM 220 MG/1
220 TABLET, FILM COATED ORAL 2 TIMES DAILY PRN
COMMUNITY

## 2025-06-16 RX ORDER — DOXYCYCLINE 100 MG/1
100 CAPSULE ORAL 2 TIMES DAILY
Qty: 28 CAPSULE | Refills: 0 | Status: SHIPPED | OUTPATIENT
Start: 2025-06-16 | End: 2025-06-30

## 2025-06-16 NOTE — PROGRESS NOTES
"Chief Complaint: Establish Care (Left testicle swelling, have had him on antibiotics/States provider thought it might be a tic bite), Testicle Pain, and Urinary Frequency    Subjective         History of Present Illness  Jose Luis Dawson is a 50 y.o. male presents to [facility] to be seen for epididymocorchitis.    He was seen in the ED at [facility] on 6/9/25 for testicular pain.    Per that note \"left testicular pain since waking up today. Patient does note that he pulled a tick off of his scrotum approximately 3 weeks ago but thinks it may have been the right side and feels that there was no head left. Had no complaints until this morning when he developed left testicular discomfort. He does note that he has had chills and the pain does seem to radiate to his left groin/pelvic region and into his left back as well. \"    He had a CT of the abdomen and pelvis performed without contrast for indication of flank pain to rule out a renal stone.  His CT scan revealed no evidence of urolithiasis or hydronephrosis.  Edema and fluid within the scrotum questional asymmetric enlargement of the left testicle recommend correlation with scrotal ultrasound.    Testicular scrotal ultrasound performed on that same date of service revealed diffusely enlarged hypervascular left testicle and hypervascular epididymis with small to moderate complex left hydrocele findings compatible with left epididymal orchitis.    His white count was within normal limits and he also had tickborne illness labs performed that were noted to be negative.    UA was also within normal limits with the exception of elevated ketones.    Urology was consulted and Dr. Mcmahon recommended hyperhydration and outpatient antibiotic therapy.    He was given Rocephin IV 1 g before leaving the hospital and was sent home with 7 days of doxycycline.    He was also given oral opioid analgesia for pain.    We did receive a message from his PCP office On " 6/12/2025 stating that the patient was having worsening swelling and wanted to be seen sooner. recommeneded being seen inthe ED if worse but informed them that the patient that the swelling can worsen prior to improving and as long as he was not having a significant amount of pain we would keep his follow-up appointment as scheduled.    He presents today having nearly completed his course of antibiotic therapy.    He does state that his symptoms have improved significantly.    He states that the swelling is nearly all gone and he is nearly back to normal with no further pain.                Objective     Past Medical History:   Diagnosis Date    Hyperthyroidism 2014    Thyroid ablasion       Past Surgical History:   Procedure Laterality Date    COLONOSCOPY  2023    HAND SURGERY Right     TEAR DUCT SURGERY Bilateral     VASECTOMY  2007         Current Outpatient Medications:     ascorbic acid (VITAMIN C) 1000 MG tablet, Take 1 tablet by mouth Daily., Disp: , Rfl:     cetirizine (zyrTEC) 10 MG tablet, Take 1 tablet by mouth Daily., Disp: , Rfl:     Cholecalciferol (Vitamin D-3) 125 MCG (5000 UT) tablet, Take  by mouth., Disp: , Rfl:     Magnesium Glycinate 120 MG capsule, Take 2 capsules by mouth Daily., Disp: , Rfl:     naproxen sodium (ALEVE) 220 MG tablet, Take 1 tablet by mouth 2 (Two) Times a Day As Needed., Disp: , Rfl:     Probiotic Product (PROBIOTIC PO), Take  by mouth., Disp: , Rfl:     Synthroid 150 MCG tablet, Take 1 tablet by mouth Daily. 1 tab by mouth every day, 0.5 tablet on Saturday and Sunday, Disp: , Rfl:     Turmeric 500 MG capsule, Take  by mouth., Disp: , Rfl:     doxycycline (VIBRAMYCIN) 100 MG capsule, Take 1 capsule by mouth 2 (Two) Times a Day for 14 days., Disp: 28 capsule, Rfl: 0    No Known Allergies     Family History   Problem Relation Age of Onset    Hypothyroidism Mother     Thyroid disease Mother     Hypertension Father     Hypothyroidism Sister     Thyroid disease Sister     Colon  "cancer Paternal Grandfather     COPD Maternal Grandfather     Arthritis Maternal Grandmother     Thyroid disease Maternal Grandmother     Arthritis Paternal Grandmother        Social History     Socioeconomic History    Marital status:    Tobacco Use    Smoking status: Every Day     Current packs/day: 0.50     Average packs/day: 0.5 packs/day for 28.5 years (14.2 ttl pk-yrs)     Types: Cigarettes     Start date: 1/1/1997    Smokeless tobacco: Never   Vaping Use    Vaping status: Never Used   Substance and Sexual Activity    Alcohol use: Yes     Alcohol/week: 10.0 standard drinks of alcohol     Types: 10 Cans of beer per week     Comment: weekends    Drug use: Never    Sexual activity: Yes     Partners: Female     Birth control/protection: Vasectomy       Vital Signs:   Ht 172.7 cm (68\")   Wt 68.9 kg (152 lb)   BMI 23.11 kg/m²      Physical Exam  Genitourinary:             Result Review :   The following data was reviewed by: ROSARIO Richardson on 06/16/2025:  Results for orders placed or performed during the hospital encounter of 06/09/25   Basic Metabolic Panel    Collection Time: 06/09/25  5:36 PM    Specimen: Blood   Result Value Ref Range    Glucose 99 65 - 99 mg/dL    BUN 10.9 6.0 - 20.0 mg/dL    Creatinine 0.75 (L) 0.76 - 1.27 mg/dL    Sodium 134 (L) 136 - 145 mmol/L    Potassium 4.0 3.5 - 5.2 mmol/L    Chloride 98 98 - 107 mmol/L    CO2 23.7 22.0 - 29.0 mmol/L    Calcium 9.0 8.6 - 10.5 mg/dL    BUN/Creatinine Ratio 14.5 7.0 - 25.0    Anion Gap 12.3 5.0 - 15.0 mmol/L    eGFR 111.3 >60.0 mL/min/1.73   CBC Auto Differential    Collection Time: 06/09/25  5:36 PM    Specimen: Blood   Result Value Ref Range    WBC 9.90 3.40 - 10.80 10*3/mm3    RBC 4.53 4.14 - 5.80 10*6/mm3    Hemoglobin 14.6 13.0 - 17.7 g/dL    Hematocrit 43.4 37.5 - 51.0 %    MCV 95.8 79.0 - 97.0 fL    MCH 32.2 26.6 - 33.0 pg    MCHC 33.6 31.5 - 35.7 g/dL    RDW 14.3 12.3 - 15.4 %    RDW-SD 50.2 37.0 - 54.0 fl    MPV 8.7 6.0 - 12.0 fL "    Platelets 229 140 - 450 10*3/mm3    Neutrophil % 84.9 (H) 42.7 - 76.0 %    Lymphocyte % 6.1 (L) 19.6 - 45.3 %    Monocyte % 7.7 5.0 - 12.0 %    Eosinophil % 0.7 0.3 - 6.2 %    Basophil % 0.3 0.0 - 1.5 %    Immature Grans % 0.3 0.0 - 0.5 %    Neutrophils, Absolute 8.41 (H) 1.70 - 7.00 10*3/mm3    Lymphocytes, Absolute 0.60 (L) 0.70 - 3.10 10*3/mm3    Monocytes, Absolute 0.76 0.10 - 0.90 10*3/mm3    Eosinophils, Absolute 0.07 0.00 - 0.40 10*3/mm3    Basophils, Absolute 0.03 0.00 - 0.20 10*3/mm3    Immature Grans, Absolute 0.03 0.00 - 0.05 10*3/mm3    nRBC 0.0 0.0 - 0.2 /100 WBC   Ehrlichia Profile DNA PCR    Collection Time: 06/09/25  5:36 PM    Specimen: Blood   Result Value Ref Range    A. phagocytophilum PCR Negative Negative    Ehrlichia sp., PCR Negative Negative   Rickettsia Species DNA, Real-Time PCR    Collection Time: 06/09/25  5:36 PM    Specimen: Blood   Result Value Ref Range    Rickettsia rickettsii DNA, RT Not Detected    Lactic Acid, Plasma    Collection Time: 06/09/25  5:44 PM    Specimen: Blood   Result Value Ref Range    Lactate 1.1 0.5 - 2.0 mmol/L   Urinalysis With Culture If Indicated - Urine, Clean Catch    Collection Time: 06/09/25  6:02 PM    Specimen: Urine, Clean Catch   Result Value Ref Range    Color, UA Yellow Yellow, Straw    Appearance, UA Clear Clear    pH, UA 8.0 5.0 - 8.0    Specific Gravity, UA 1.019 1.005 - 1.030    Glucose, UA Negative Negative    Ketones, UA 40 mg/dL (2+) (A) Negative    Bilirubin, UA Negative Negative    Blood, UA Negative Negative    Protein, UA Negative Negative    Leuk Esterase, UA Negative Negative    Nitrite, UA Negative Negative    Urobilinogen, UA 1.0 E.U./dL 0.2 - 1.0 E.U./dL   Blood Culture - Blood, Arm, Left    Collection Time: 06/09/25  6:24 PM    Specimen: Arm, Left; Blood   Result Value Ref Range    Blood Culture No growth at 5 days    Blood Culture - Blood, Arm, Right    Collection Time: 06/09/25  6:24 PM    Specimen: Arm, Right; Blood   Result  Value Ref Range    Blood Culture No growth at 5 days    COVID-19, FLU A/B, RSV PCR 1 HR TAT - Swab, Nasopharynx    Collection Time: 06/09/25  6:26 PM    Specimen: Nasopharynx; Swab   Result Value Ref Range    COVID19 Not Detected Not Detected - Ref. Range    Influenza A PCR Not Detected Not Detected    Influenza B PCR Not Detected Not Detected    RSV, PCR Not Detected Not Detected   Lyme Disease Total Antibody With Reflex to Immunoassay    Collection Time: 06/09/25  7:19 PM    Specimen: Blood   Result Value Ref Range    Lyme Total Antibody EIA Negative Negative        US SCROTUM AND TESTICLES     Date of Exam: 6/9/2025 6:28 PM EDT     Indication: Left testicular/scrotal swelling, fever.     Comparison: CT abdomen pelvis without contrast dated 6/9/2025     Technique: Multiple sonographic images of the scrotum were obtained in transverse and longitudinal planes. Grayscale and color Doppler duplex techniques were utilized.  Doppler spectral analysis was performed.        Findings:  Testicles are normal in position and morphology without intratesticular mass or microlithiasis. The right testicle is 4.4 x 2.3 x 2.6 cm (14 mL). The left testicle is mildly enlarged measuring 4.3 x 2.9 x 3.3 cm (21 mL). The left testicle and left   epididymis are markedly hypervascular relative to the left testicle and epididymis. No epididymal masses. The epididymis are symmetric in size the epididymal head measures 1 cm in maximum dimension each. There is a small to moderate left hydrocele with   mobile debris. No right-sided hydroceles. No varicoceles or peristalsing bowel. No scrotal wall edema.     IMPRESSION:  Impression:  Diffusely enlarged hypervascular left testicle and hypervascular epididymis with a small to moderate complex left hydrocele. Findings are compatible with left epididymoorchitis.           Electronically Signed: Otto Shanks DO    6/9/2025 7:14 PM EDT    Workstation ID: [id number]            CT ABDOMEN PELVIS WO  CONTRAST     Date of Exam: 6/9/2025 5:32 PM EDT     Indication: Flank pain, kidney stone suspected, Rule out left ureteral stone, flank pain.     Comparison: None available     Technique: Axial CT images were obtained of the abdomen and pelvis without the administration of contrast. Reconstructed coronal and sagittal images were also obtained. Automated exposure control and iterative construction methods were used.        Findings:  The heart size is normal. There is no pericardial effusion. The lung bases are clear.     The liver is normal in size and contour. There is no focal liver lesion by noncontrast technique. The gallbladder is present without wall thickening. There is no intrahepatic or extrahepatic biliary ductal dilatation.     The spleen is normal in size. The adrenal glands and pancreas appear within normal limits. There is no pancreatic ductal dilatation.     The kidneys are symmetric in size. There is no hydronephrosis or hydroureter. There is no urolithiasis. The urinary bladder is fluid-filled without wall thickening. There is edema and fluid within the scrotum. Question asymmetric enlargement of the left   testicle.     The stomach and duodenum are normal in caliber and configuration. There are no abnormally dilated loops of small bowel to suggest small bowel obstruction or small bowel inflammation. The appendix is visualized and normal within the right lower quadrant.   There is no acute colitis or diverticulitis. There is no free fluid or free air.     The aorta is normal in caliber without aneurysm formation. There is no lymphadenopathy. There are no acute osseous findings.     IMPRESSION:  Impression:  1.No evidence of urolithiasis or hydronephrosis.  2.Edema and fluid within the scrotum. Question asymmetric enlargement of the left testicle. Recommend correlation with scrotal ultrasound.           Electronically Signed: Esa Rai MD    6/9/2025 5:54 PM EDT    Workstation ID:  [id number]      Procedures        Assessment and Plan    Diagnoses and all orders for this visit:    1. Epididymoorchitis (Primary)  -     doxycycline (VIBRAMYCIN) 100 MG capsule; Take 1 capsule by mouth 2 (Two) Times a Day for 14 days.  Dispense: 28 capsule; Refill: 0  -     US Scrotum & Testicles; Future        We will extend his course of antibiotics for a bit longer given his exam today.     Will plan for f/u in 4 weeks with a repeat u/s.    He will call the office with any new or worsening symptoms.      I spent 15 minutes caring for Jose Luis on this date of service. This time includes time spent by me in the following activities:reviewing tests, obtaining and/or reviewing a separately obtained history, performing a medically appropriate examination and/or evaluation , counseling and educating the patient/family/caregiver, ordering medications, tests, or procedures, and documenting information in the medical record  Follow Up   Return in about 4 weeks (around 7/14/2025) for f/u epididimoorchitis.  Patient was given instructions and counseling regarding his condition or for health maintenance advice. Please see specific information pulled into the AVS if appropriate.         This document has been electronically signed by ROSARIO Richardson  June 16, 2025 13:18 EDT

## 2025-06-17 ENCOUNTER — OFFICE VISIT (OUTPATIENT)
Dept: FAMILY MEDICINE CLINIC | Age: 50
End: 2025-06-17
Payer: COMMERCIAL

## 2025-06-17 ENCOUNTER — TELEPHONE (OUTPATIENT)
Dept: FAMILY MEDICINE CLINIC | Age: 50
End: 2025-06-17

## 2025-06-17 VITALS
WEIGHT: 151 LBS | BODY MASS INDEX: 22.88 KG/M2 | OXYGEN SATURATION: 100 % | TEMPERATURE: 97.9 F | DIASTOLIC BLOOD PRESSURE: 70 MMHG | SYSTOLIC BLOOD PRESSURE: 118 MMHG | HEART RATE: 75 BPM | HEIGHT: 68 IN

## 2025-06-17 DIAGNOSIS — Z87.438 HISTORY OF ORCHITIS: Primary | ICD-10-CM

## 2025-06-17 NOTE — PROGRESS NOTES
"Chief Complaint  Transitional Care Management ([facility] ER f/u from 6/9 for Epididymoorchitis - discuss FMLA)    Subjective          Jose Luis Dawson presents to [facility]     Patient is a 50-year-old male he went to the emergency room at [facility] on June 9 for left testicular swelling and chills.  He had removed a tick from the area somewhat recently but tick related illness testing was negative.  He had ultrasound done along with CT of the abdomen and pelvis and ultrasound indicated epididymoorchitis.  He received Rocephin at the hospital and sent home on 7 days of doxycycline.  He saw urologist Joseline Crockett yesterday who has extended the course of doxycycline and will repeat the testicular ultrasound in 3 to 4 weeks.  He has been afebrile for about 6 days now.  The hospital recommended he not work for 5 days.  He needs FMLA paperwork completed due to missing more than 3 days consecutively.  He will also need some temporary intermittent FMLA to attend urology follow-up and testing.  Patient states he is feeling much better.  Denies any concerns today.     Objective   Vital Signs:   Vitals:    06/17/25 1526   BP: 118/70   BP Location: Left arm   Patient Position: Sitting   Cuff Size: Adult   Pulse: 75   Temp: 97.9 °F (36.6 °C)   TempSrc: Temporal   SpO2: 100%   Weight: 68.5 kg (151 lb)   Height: 172.7 cm (68\")       Wt Readings from Last 3 Encounters:   06/17/25 68.5 kg (151 lb)   06/16/25 68.9 kg (152 lb)   06/09/25 69.2 kg (152 lb 8.9 oz)      BP Readings from Last 3 Encounters:   06/17/25 118/70   06/09/25 119/69   10/10/24 119/71       Body mass index is 22.96 kg/m².    BMI is within normal parameters. No other follow-up for BMI required.       Physical Exam  Vitals reviewed.   Constitutional:       General: He is not in acute distress.     Appearance: Normal appearance. He is well-developed.   Cardiovascular:      Rate and Rhythm: Normal rate and regular rhythm.      " Heart sounds: Normal heart sounds.   Pulmonary:      Effort: Pulmonary effort is normal.      Breath sounds: Normal breath sounds.   Musculoskeletal:      Right lower leg: No edema.      Left lower leg: No edema.   Skin:     General: Skin is warm and dry.   Neurological:      General: No focal deficit present.      Mental Status: He is alert.   Psychiatric:         Attention and Perception: Attention normal.         Mood and Affect: Mood and affect normal.         Behavior: Behavior normal.           Current Outpatient Medications:     ascorbic acid (VITAMIN C) 1000 MG tablet, Take 1 tablet by mouth Daily., Disp: , Rfl:     cetirizine (zyrTEC) 10 MG tablet, Take 1 tablet by mouth Daily., Disp: , Rfl:     Cholecalciferol (Vitamin D-3) 125 MCG (5000 UT) tablet, Take  by mouth., Disp: , Rfl:     doxycycline (VIBRAMYCIN) 100 MG capsule, Take 1 capsule by mouth 2 (Two) Times a Day for 14 days., Disp: 28 capsule, Rfl: 0    Magnesium Glycinate 120 MG capsule, Take 2 capsules by mouth Daily., Disp: , Rfl:     naproxen sodium (ALEVE) 220 MG tablet, Take 1 tablet by mouth 2 (Two) Times a Day As Needed., Disp: , Rfl:     Probiotic Product (PROBIOTIC PO), Take  by mouth., Disp: , Rfl:     Synthroid 150 MCG tablet, Take 1 tablet by mouth Daily. 1 tab by mouth every day, 0.5 tablet on Saturday and Sunday, Disp: , Rfl:     Turmeric 500 MG capsule, Take  by mouth., Disp: , Rfl:    Past Medical History:   Diagnosis Date    Hyperthyroidism 2014    Thyroid ablasion     No Known Allergies            Result Review :     Common labs          10/11/2024    09:22 6/9/2025    17:36   Common Labs   Glucose 88  99    BUN 12  10.9    Creatinine 0.86  0.75    Sodium 140  134    Potassium 4.3  4.0    Chloride 106  98    Calcium 9.0  9.0    Albumin 4.4     Total Bilirubin 0.9     Alkaline Phosphatase 61     AST (SGOT) 21     ALT (SGPT) 22     WBC 4.68  9.90    Hemoglobin 13.8  14.6    Hematocrit 42.2  43.4    Platelets 234  229    Total  Cholesterol 192     Triglycerides 72     HDL Cholesterol 87     LDL Cholesterol  92     Hemoglobin A1C 5.10          US Scrotum & Testicles  Result Date: 6/9/2025  Impression: Diffusely enlarged hypervascular left testicle and hypervascular epididymis with a small to moderate complex left hydrocele. Findings are compatible with left epididymoorchitis. Electronically Signed: Ottomelva Shanks DO  6/9/2025 7:14 PM EDT  Workstation ID: [id number]    CT Abdomen Pelvis Without Contrast  Result Date: 6/9/2025  Impression: 1.No evidence of urolithiasis or hydronephrosis. 2.Edema and fluid within the scrotum. Question asymmetric enlargement of the left testicle. Recommend correlation with scrotal ultrasound. Electronically Signed: Esa Rai MD  6/9/2025 5:54 PM EDT  Workstation ID: [id number]             Social History     Tobacco Use   Smoking Status Every Day    Current packs/day: 0.50    Average packs/day: 0.5 packs/day for 28.5 years (14.2 ttl pk-yrs)    Types: Cigarettes    Start date: 1/1/1997   Smokeless Tobacco Never           Assessment and Plan    Diagnoses and all orders for this visit:    1. History of orchitis (Primary)  Assessment & Plan:  Okay for continuous FMLA from [employer] through 16.  He returned to work today June 17 with no restrictions.  Starting today he will get intermittent FMLA 1-3 times per month x 6 months.  Follow-up with urology as scheduled.  Follow-up here for any other concerns          Follow Up    No follow-ups on file.  Patient was given instructions and counseling regarding his condition or for health maintenance advice. Please see specific information pulled into the AVS if appropriate.

## 2025-06-17 NOTE — ASSESSMENT & PLAN NOTE
Ryder for continuous FMLA from [employer] through 16.  He returned to work today June 17 with no restrictions.  Starting today he will get intermittent FMLA 1-3 times per month x 6 months.  Follow-up with urology as scheduled.  Follow-up here for any other concerns

## 2025-07-07 ENCOUNTER — HOSPITAL ENCOUNTER (OUTPATIENT)
Dept: ULTRASOUND IMAGING | Facility: HOSPITAL | Age: 50
Discharge: HOME | End: 2025-07-07
Admitting: NURSE PRACTITIONER
Payer: COMMERCIAL

## 2025-07-07 DIAGNOSIS — N45.3 EPIDIDYMOORCHITIS: ICD-10-CM

## 2025-07-07 PROCEDURE — 76870 US EXAM SCROTUM: CPT

## 2025-07-24 ENCOUNTER — TELEPHONE (OUTPATIENT)
Dept: UROLOGY | Facility: CLINIC | Age: 50
End: 2025-07-24
Payer: COMMERCIAL

## 2025-07-24 NOTE — TELEPHONE ENCOUNTER
"PT CX'D HIS APPT WITH [facility] FOR 7/14/25 PER CX NOTES, \"(Normal results from US. ) \" ANYTHING ELSE TO DO?  "